# Patient Record
Sex: FEMALE | Race: ASIAN | NOT HISPANIC OR LATINO | URBAN - METROPOLITAN AREA
[De-identification: names, ages, dates, MRNs, and addresses within clinical notes are randomized per-mention and may not be internally consistent; named-entity substitution may affect disease eponyms.]

---

## 2019-03-14 ENCOUNTER — INPATIENT (INPATIENT)
Facility: HOSPITAL | Age: 39
LOS: 5 days | Discharge: HOME | End: 2019-03-20
Attending: OBSTETRICS & GYNECOLOGY | Admitting: OBSTETRICS & GYNECOLOGY
Payer: COMMERCIAL

## 2019-03-14 VITALS — WEIGHT: 167.55 LBS | HEIGHT: 62 IN

## 2019-03-14 DIAGNOSIS — O26.899 OTHER SPECIFIED PREGNANCY RELATED CONDITIONS, UNSPECIFIED TRIMESTER: ICD-10-CM

## 2019-03-14 DIAGNOSIS — Z3A.00 WEEKS OF GESTATION OF PREGNANCY NOT SPECIFIED: ICD-10-CM

## 2019-03-14 LAB
BASOPHILS # BLD AUTO: 0.07 K/UL — SIGNIFICANT CHANGE UP (ref 0–0.2)
BASOPHILS NFR BLD AUTO: 0.9 % — SIGNIFICANT CHANGE UP (ref 0–2)
BLD GP AB SCN SERPL QL: NEGATIVE — SIGNIFICANT CHANGE UP
EOSINOPHIL # BLD AUTO: 0.12 K/UL — SIGNIFICANT CHANGE UP (ref 0–0.5)
EOSINOPHIL NFR BLD AUTO: 1.5 % — SIGNIFICANT CHANGE UP (ref 0–6)
GLUCOSE BLDC GLUCOMTR-MCNC: 106 MG/DL — HIGH (ref 70–99)
HCT VFR BLD CALC: 33.8 % — LOW (ref 34.5–45)
HGB BLD-MCNC: 11.9 G/DL — SIGNIFICANT CHANGE UP (ref 11.5–15.5)
IMM GRANULOCYTES NFR BLD AUTO: 1.6 % — HIGH (ref 0–1.5)
LYMPHOCYTES # BLD AUTO: 1.67 K/UL — SIGNIFICANT CHANGE UP (ref 1–3.3)
LYMPHOCYTES # BLD AUTO: 21.2 % — SIGNIFICANT CHANGE UP (ref 13–44)
MCHC RBC-ENTMCNC: 33.1 PG — SIGNIFICANT CHANGE UP (ref 27–34)
MCHC RBC-ENTMCNC: 35.2 GM/DL — SIGNIFICANT CHANGE UP (ref 32–36)
MCV RBC AUTO: 94.2 FL — SIGNIFICANT CHANGE UP (ref 80–100)
MONOCYTES # BLD AUTO: 0.57 K/UL — SIGNIFICANT CHANGE UP (ref 0–0.9)
MONOCYTES NFR BLD AUTO: 7.2 % — SIGNIFICANT CHANGE UP (ref 2–14)
NEUTROPHILS # BLD AUTO: 5.33 K/UL — SIGNIFICANT CHANGE UP (ref 1.8–7.4)
NEUTROPHILS NFR BLD AUTO: 67.6 % — SIGNIFICANT CHANGE UP (ref 43–77)
NRBC # BLD: 0 /100 WBCS — SIGNIFICANT CHANGE UP (ref 0–0)
PLATELET # BLD AUTO: 237 K/UL — SIGNIFICANT CHANGE UP (ref 150–400)
RBC # BLD: 3.59 M/UL — LOW (ref 3.8–5.2)
RBC # FLD: 12.7 % — SIGNIFICANT CHANGE UP (ref 10.3–14.5)
RH IG SCN BLD-IMP: POSITIVE — SIGNIFICANT CHANGE UP
RH IG SCN BLD-IMP: POSITIVE — SIGNIFICANT CHANGE UP
WBC # BLD: 7.89 K/UL — SIGNIFICANT CHANGE UP (ref 3.8–10.5)
WBC # FLD AUTO: 7.89 K/UL — SIGNIFICANT CHANGE UP (ref 3.8–10.5)

## 2019-03-14 RX ORDER — SODIUM CHLORIDE 9 MG/ML
1000 INJECTION, SOLUTION INTRAVENOUS ONCE
Qty: 0 | Refills: 0 | Status: DISCONTINUED | OUTPATIENT
Start: 2019-03-14 | End: 2019-03-16

## 2019-03-14 RX ORDER — SODIUM CHLORIDE 9 MG/ML
1000 INJECTION, SOLUTION INTRAVENOUS
Qty: 0 | Refills: 0 | Status: DISCONTINUED | OUTPATIENT
Start: 2019-03-14 | End: 2019-03-16

## 2019-03-14 RX ORDER — OXYTOCIN 10 UNIT/ML
333.33 VIAL (ML) INJECTION
Qty: 20 | Refills: 0 | Status: DISCONTINUED | OUTPATIENT
Start: 2019-03-14 | End: 2019-03-16

## 2019-03-14 RX ORDER — CITRIC ACID/SODIUM CITRATE 300-500 MG
15 SOLUTION, ORAL ORAL EVERY 4 HOURS
Qty: 0 | Refills: 0 | Status: DISCONTINUED | OUTPATIENT
Start: 2019-03-14 | End: 2019-03-16

## 2019-03-14 RX ORDER — DINOPROSTONE 10 MG/241MG
10 INSERT VAGINAL ONCE
Qty: 0 | Refills: 0 | Status: DISCONTINUED | OUTPATIENT
Start: 2019-03-14 | End: 2019-03-16

## 2019-03-14 NOTE — PATIENT PROFILE OB - VISION (WITH CORRECTIVE LENSES IF THE PATIENT USUALLY WEARS THEM):
with contacts/Normal vision: sees adequately in most situations; can see medication labels, newsprint

## 2019-03-15 ENCOUNTER — RESULT REVIEW (OUTPATIENT)
Age: 39
End: 2019-03-15

## 2019-03-15 PROBLEM — Z00.00 ENCOUNTER FOR PREVENTIVE HEALTH EXAMINATION: Status: ACTIVE | Noted: 2019-03-15

## 2019-03-15 LAB — T PALLIDUM AB TITR SER: NEGATIVE — SIGNIFICANT CHANGE UP

## 2019-03-15 RX ORDER — OXYTOCIN 10 UNIT/ML
1 VIAL (ML) INJECTION
Qty: 30 | Refills: 0 | Status: DISCONTINUED | OUTPATIENT
Start: 2019-03-15 | End: 2019-03-16

## 2019-03-15 RX ORDER — FENTANYL/BUPIVACAINE/NS/PF 2MCG/ML-.1
250 PLASTIC BAG, INJECTION (ML) INJECTION
Qty: 0 | Refills: 0 | Status: DISCONTINUED | OUTPATIENT
Start: 2019-03-15 | End: 2019-03-16

## 2019-03-15 RX ADMIN — Medication 1 MILLIUNIT(S)/MIN: at 10:11

## 2019-03-15 RX ADMIN — SODIUM CHLORIDE 125 MILLILITER(S): 9 INJECTION, SOLUTION INTRAVENOUS at 00:45

## 2019-03-15 RX ADMIN — SODIUM CHLORIDE 125 MILLILITER(S): 9 INJECTION, SOLUTION INTRAVENOUS at 23:10

## 2019-03-16 LAB
ALBUMIN SERPL ELPH-MCNC: 2.6 G/DL — LOW (ref 3.3–5)
ALBUMIN SERPL ELPH-MCNC: 3 G/DL — LOW (ref 3.3–5)
ALP SERPL-CCNC: 114 U/L — SIGNIFICANT CHANGE UP (ref 40–120)
ALP SERPL-CCNC: 91 U/L — SIGNIFICANT CHANGE UP (ref 40–120)
ALT FLD-CCNC: 10 U/L — SIGNIFICANT CHANGE UP (ref 10–45)
ALT FLD-CCNC: 13 U/L — SIGNIFICANT CHANGE UP (ref 10–45)
ANION GAP SERPL CALC-SCNC: 12 MMOL/L — SIGNIFICANT CHANGE UP (ref 5–17)
ANION GAP SERPL CALC-SCNC: 15 MMOL/L — SIGNIFICANT CHANGE UP (ref 5–17)
AST SERPL-CCNC: 26 U/L — SIGNIFICANT CHANGE UP (ref 10–40)
AST SERPL-CCNC: 30 U/L — SIGNIFICANT CHANGE UP (ref 10–40)
BASOPHILS # BLD AUTO: 0 K/UL — SIGNIFICANT CHANGE UP (ref 0–0.2)
BASOPHILS # BLD AUTO: 0.05 K/UL — SIGNIFICANT CHANGE UP (ref 0–0.2)
BASOPHILS NFR BLD AUTO: 0 % — SIGNIFICANT CHANGE UP (ref 0–2)
BASOPHILS NFR BLD AUTO: 0.4 % — SIGNIFICANT CHANGE UP (ref 0–2)
BILIRUB SERPL-MCNC: 0.3 MG/DL — SIGNIFICANT CHANGE UP (ref 0.2–1.2)
BILIRUB SERPL-MCNC: 0.3 MG/DL — SIGNIFICANT CHANGE UP (ref 0.2–1.2)
BUN SERPL-MCNC: 10 MG/DL — SIGNIFICANT CHANGE UP (ref 7–23)
BUN SERPL-MCNC: 12 MG/DL — SIGNIFICANT CHANGE UP (ref 7–23)
CALCIUM SERPL-MCNC: 8 MG/DL — LOW (ref 8.4–10.5)
CALCIUM SERPL-MCNC: 8.4 MG/DL — SIGNIFICANT CHANGE UP (ref 8.4–10.5)
CHLORIDE SERPL-SCNC: 101 MMOL/L — SIGNIFICANT CHANGE UP (ref 96–108)
CHLORIDE SERPL-SCNC: 102 MMOL/L — SIGNIFICANT CHANGE UP (ref 96–108)
CO2 SERPL-SCNC: 18 MMOL/L — LOW (ref 22–31)
CO2 SERPL-SCNC: 21 MMOL/L — LOW (ref 22–31)
CREAT ?TM UR-MCNC: 57 MG/DL — SIGNIFICANT CHANGE UP
CREAT SERPL-MCNC: 0.93 MG/DL — SIGNIFICANT CHANGE UP (ref 0.5–1.3)
CREAT SERPL-MCNC: 1.42 MG/DL — HIGH (ref 0.5–1.3)
EOSINOPHIL # BLD AUTO: 0 K/UL — SIGNIFICANT CHANGE UP (ref 0–0.5)
EOSINOPHIL # BLD AUTO: 0.02 K/UL — SIGNIFICANT CHANGE UP (ref 0–0.5)
EOSINOPHIL NFR BLD AUTO: 0 % — SIGNIFICANT CHANGE UP (ref 0–6)
EOSINOPHIL NFR BLD AUTO: 0.1 % — SIGNIFICANT CHANGE UP (ref 0–6)
GLUCOSE SERPL-MCNC: 108 MG/DL — HIGH (ref 70–99)
GLUCOSE SERPL-MCNC: 187 MG/DL — HIGH (ref 70–99)
HCT VFR BLD CALC: 26 % — LOW (ref 34.5–45)
HCT VFR BLD CALC: 31.3 % — LOW (ref 34.5–45)
HGB BLD-MCNC: 10.8 G/DL — LOW (ref 11.5–15.5)
HGB BLD-MCNC: 8.7 G/DL — LOW (ref 11.5–15.5)
IMM GRANULOCYTES NFR BLD AUTO: 1 % — SIGNIFICANT CHANGE UP (ref 0–1.5)
LDH SERPL L TO P-CCNC: 256 U/L — HIGH (ref 50–242)
LYMPHOCYTES # BLD AUTO: 0.46 K/UL — LOW (ref 1–3.3)
LYMPHOCYTES # BLD AUTO: 0.93 K/UL — LOW (ref 1–3.3)
LYMPHOCYTES # BLD AUTO: 2.6 % — LOW (ref 13–44)
LYMPHOCYTES # BLD AUTO: 6.7 % — LOW (ref 13–44)
MAGNESIUM SERPL-MCNC: 5.4 MG/DL — HIGH (ref 1.6–2.6)
MAGNESIUM SERPL-MCNC: 6.2 MG/DL — HIGH (ref 1.6–2.6)
MAGNESIUM SERPL-MCNC: >17.6 MG/DL — CRITICAL HIGH (ref 1.6–2.6)
MCHC RBC-ENTMCNC: 31.4 PG — SIGNIFICANT CHANGE UP (ref 27–34)
MCHC RBC-ENTMCNC: 32.2 PG — SIGNIFICANT CHANGE UP (ref 27–34)
MCHC RBC-ENTMCNC: 33.5 GM/DL — SIGNIFICANT CHANGE UP (ref 32–36)
MCHC RBC-ENTMCNC: 34.5 GM/DL — SIGNIFICANT CHANGE UP (ref 32–36)
MCV RBC AUTO: 93.4 FL — SIGNIFICANT CHANGE UP (ref 80–100)
MCV RBC AUTO: 93.9 FL — SIGNIFICANT CHANGE UP (ref 80–100)
MONOCYTES # BLD AUTO: 0.46 K/UL — SIGNIFICANT CHANGE UP (ref 0–0.9)
MONOCYTES # BLD AUTO: 1.02 K/UL — HIGH (ref 0–0.9)
MONOCYTES NFR BLD AUTO: 2.6 % — SIGNIFICANT CHANGE UP (ref 2–14)
MONOCYTES NFR BLD AUTO: 7.3 % — SIGNIFICANT CHANGE UP (ref 2–14)
NEUTROPHILS # BLD AUTO: 11.79 K/UL — HIGH (ref 1.8–7.4)
NEUTROPHILS # BLD AUTO: 16.54 K/UL — HIGH (ref 1.8–7.4)
NEUTROPHILS NFR BLD AUTO: 84.5 % — HIGH (ref 43–77)
NEUTROPHILS NFR BLD AUTO: 87.1 % — HIGH (ref 43–77)
NRBC # BLD: 0 /100 WBCS — SIGNIFICANT CHANGE UP (ref 0–0)
PLATELET # BLD AUTO: 174 K/UL — SIGNIFICANT CHANGE UP (ref 150–400)
PLATELET # BLD AUTO: 185 K/UL — SIGNIFICANT CHANGE UP (ref 150–400)
POTASSIUM SERPL-MCNC: 3.8 MMOL/L — SIGNIFICANT CHANGE UP (ref 3.5–5.3)
POTASSIUM SERPL-MCNC: 4.2 MMOL/L — SIGNIFICANT CHANGE UP (ref 3.5–5.3)
POTASSIUM SERPL-SCNC: 3.8 MMOL/L — SIGNIFICANT CHANGE UP (ref 3.5–5.3)
POTASSIUM SERPL-SCNC: 4.2 MMOL/L — SIGNIFICANT CHANGE UP (ref 3.5–5.3)
PROT ?TM UR-MCNC: 20 MG/DL — HIGH (ref 0–12)
PROT SERPL-MCNC: 5 G/DL — LOW (ref 6–8.3)
PROT SERPL-MCNC: 5.9 G/DL — LOW (ref 6–8.3)
PROT/CREAT UR-RTO: 0.4 RATIO — HIGH (ref 0–0.2)
RBC # BLD: 2.77 M/UL — LOW (ref 3.8–5.2)
RBC # BLD: 3.35 M/UL — LOW (ref 3.8–5.2)
RBC # FLD: 12.9 % — SIGNIFICANT CHANGE UP (ref 10.3–14.5)
RBC # FLD: 12.9 % — SIGNIFICANT CHANGE UP (ref 10.3–14.5)
SODIUM SERPL-SCNC: 134 MMOL/L — LOW (ref 135–145)
SODIUM SERPL-SCNC: 135 MMOL/L — SIGNIFICANT CHANGE UP (ref 135–145)
URATE SERPL-MCNC: 7.4 MG/DL — HIGH (ref 2.5–7)
WBC # BLD: 13.95 K/UL — HIGH (ref 3.8–10.5)
WBC # BLD: 17.6 K/UL — HIGH (ref 3.8–10.5)
WBC # FLD AUTO: 13.95 K/UL — HIGH (ref 3.8–10.5)
WBC # FLD AUTO: 17.6 K/UL — HIGH (ref 3.8–10.5)

## 2019-03-16 RX ORDER — OXYCODONE HYDROCHLORIDE 5 MG/1
5 TABLET ORAL
Qty: 0 | Refills: 0 | Status: DISCONTINUED | OUTPATIENT
Start: 2019-03-16 | End: 2019-03-20

## 2019-03-16 RX ORDER — TETANUS TOXOID, REDUCED DIPHTHERIA TOXOID AND ACELLULAR PERTUSSIS VACCINE, ADSORBED 5; 2.5; 8; 8; 2.5 [IU]/.5ML; [IU]/.5ML; UG/.5ML; UG/.5ML; UG/.5ML
0.5 SUSPENSION INTRAMUSCULAR ONCE
Qty: 0 | Refills: 0 | Status: DISCONTINUED | OUTPATIENT
Start: 2019-03-16 | End: 2019-03-20

## 2019-03-16 RX ORDER — OXYTOCIN 10 UNIT/ML
41.67 VIAL (ML) INJECTION
Qty: 20 | Refills: 0 | Status: DISCONTINUED | OUTPATIENT
Start: 2019-03-16 | End: 2019-03-20

## 2019-03-16 RX ORDER — SIMETHICONE 80 MG/1
80 TABLET, CHEWABLE ORAL EVERY 4 HOURS
Qty: 0 | Refills: 0 | Status: DISCONTINUED | OUTPATIENT
Start: 2019-03-16 | End: 2019-03-20

## 2019-03-16 RX ORDER — OXYTOCIN 10 UNIT/ML
333.33 VIAL (ML) INJECTION
Qty: 20 | Refills: 0 | Status: DISCONTINUED | OUTPATIENT
Start: 2019-03-16 | End: 2019-03-20

## 2019-03-16 RX ORDER — ACETAMINOPHEN 500 MG
650 TABLET ORAL EVERY 6 HOURS
Qty: 0 | Refills: 0 | Status: DISCONTINUED | OUTPATIENT
Start: 2019-03-16 | End: 2019-03-20

## 2019-03-16 RX ORDER — NALOXONE HYDROCHLORIDE 4 MG/.1ML
0.1 SPRAY NASAL
Qty: 0 | Refills: 0 | Status: DISCONTINUED | OUTPATIENT
Start: 2019-03-16 | End: 2019-03-20

## 2019-03-16 RX ORDER — SODIUM CHLORIDE 9 MG/ML
1000 INJECTION, SOLUTION INTRAVENOUS
Qty: 0 | Refills: 0 | Status: DISCONTINUED | OUTPATIENT
Start: 2019-03-16 | End: 2019-03-16

## 2019-03-16 RX ORDER — ONDANSETRON 8 MG/1
4 TABLET, FILM COATED ORAL EVERY 6 HOURS
Qty: 0 | Refills: 0 | Status: DISCONTINUED | OUTPATIENT
Start: 2019-03-16 | End: 2019-03-20

## 2019-03-16 RX ORDER — OXYCODONE AND ACETAMINOPHEN 5; 325 MG/1; MG/1
2 TABLET ORAL EVERY 6 HOURS
Qty: 0 | Refills: 0 | Status: DISCONTINUED | OUTPATIENT
Start: 2019-03-16 | End: 2019-03-16

## 2019-03-16 RX ORDER — FERROUS SULFATE 325(65) MG
325 TABLET ORAL DAILY
Qty: 0 | Refills: 0 | Status: DISCONTINUED | OUTPATIENT
Start: 2019-03-16 | End: 2019-03-18

## 2019-03-16 RX ORDER — CITRIC ACID/SODIUM CITRATE 300-500 MG
30 SOLUTION, ORAL ORAL ONCE
Qty: 0 | Refills: 0 | Status: COMPLETED | OUTPATIENT
Start: 2019-03-16 | End: 2019-03-16

## 2019-03-16 RX ORDER — DIPHENHYDRAMINE HCL 50 MG
25 CAPSULE ORAL EVERY 6 HOURS
Qty: 0 | Refills: 0 | Status: DISCONTINUED | OUTPATIENT
Start: 2019-03-16 | End: 2019-03-20

## 2019-03-16 RX ORDER — AZITHROMYCIN 500 MG/1
500 TABLET, FILM COATED ORAL ONCE
Qty: 0 | Refills: 0 | Status: COMPLETED | OUTPATIENT
Start: 2019-03-16 | End: 2019-03-16

## 2019-03-16 RX ORDER — SODIUM CHLORIDE 9 MG/ML
1000 INJECTION, SOLUTION INTRAVENOUS
Qty: 0 | Refills: 0 | Status: DISCONTINUED | OUTPATIENT
Start: 2019-03-16 | End: 2019-03-20

## 2019-03-16 RX ORDER — GLYCERIN ADULT
1 SUPPOSITORY, RECTAL RECTAL AT BEDTIME
Qty: 0 | Refills: 0 | Status: DISCONTINUED | OUTPATIENT
Start: 2019-03-16 | End: 2019-03-20

## 2019-03-16 RX ORDER — CEFAZOLIN SODIUM 1 G
2000 VIAL (EA) INJECTION ONCE
Qty: 0 | Refills: 0 | Status: COMPLETED | OUTPATIENT
Start: 2019-03-16 | End: 2019-03-16

## 2019-03-16 RX ORDER — DOCUSATE SODIUM 100 MG
100 CAPSULE ORAL
Qty: 0 | Refills: 0 | Status: DISCONTINUED | OUTPATIENT
Start: 2019-03-16 | End: 2019-03-20

## 2019-03-16 RX ORDER — OXYCODONE HYDROCHLORIDE 5 MG/1
10 TABLET ORAL
Qty: 0 | Refills: 0 | Status: DISCONTINUED | OUTPATIENT
Start: 2019-03-16 | End: 2019-03-20

## 2019-03-16 RX ORDER — LANOLIN
1 OINTMENT (GRAM) TOPICAL
Qty: 0 | Refills: 0 | Status: DISCONTINUED | OUTPATIENT
Start: 2019-03-16 | End: 2019-03-20

## 2019-03-16 RX ORDER — MAGNESIUM SULFATE 500 MG/ML
2 VIAL (ML) INJECTION
Qty: 40 | Refills: 0 | Status: DISCONTINUED | OUTPATIENT
Start: 2019-03-16 | End: 2019-03-17

## 2019-03-16 RX ORDER — MAGNESIUM SULFATE 500 MG/ML
4 VIAL (ML) INJECTION ONCE
Qty: 0 | Refills: 0 | Status: COMPLETED | OUTPATIENT
Start: 2019-03-16 | End: 2019-03-16

## 2019-03-16 RX ORDER — ONDANSETRON 8 MG/1
4 TABLET, FILM COATED ORAL EVERY 6 HOURS
Qty: 0 | Refills: 0 | Status: COMPLETED | OUTPATIENT
Start: 2019-03-16 | End: 2019-03-16

## 2019-03-16 RX ORDER — OXYCODONE AND ACETAMINOPHEN 5; 325 MG/1; MG/1
1 TABLET ORAL
Qty: 0 | Refills: 0 | Status: DISCONTINUED | OUTPATIENT
Start: 2019-03-16 | End: 2019-03-16

## 2019-03-16 RX ORDER — IBUPROFEN 200 MG
600 TABLET ORAL EVERY 6 HOURS
Qty: 0 | Refills: 0 | Status: DISCONTINUED | OUTPATIENT
Start: 2019-03-16 | End: 2019-03-20

## 2019-03-16 RX ADMIN — OXYCODONE HYDROCHLORIDE 5 MILLIGRAM(S): 5 TABLET ORAL at 19:11

## 2019-03-16 RX ADMIN — ONDANSETRON 4 MILLIGRAM(S): 8 TABLET, FILM COATED ORAL at 15:46

## 2019-03-16 RX ADMIN — Medication 600 MILLIGRAM(S): at 14:29

## 2019-03-16 RX ADMIN — Medication 100 MILLIGRAM(S): at 01:36

## 2019-03-16 RX ADMIN — Medication 600 MILLIGRAM(S): at 23:15

## 2019-03-16 RX ADMIN — Medication 30 MILLILITER(S): at 01:37

## 2019-03-16 RX ADMIN — OXYCODONE HYDROCHLORIDE 5 MILLIGRAM(S): 5 TABLET ORAL at 22:33

## 2019-03-16 RX ADMIN — OXYCODONE HYDROCHLORIDE 5 MILLIGRAM(S): 5 TABLET ORAL at 20:00

## 2019-03-16 RX ADMIN — OXYCODONE HYDROCHLORIDE 5 MILLIGRAM(S): 5 TABLET ORAL at 23:15

## 2019-03-16 RX ADMIN — Medication 600 MILLIGRAM(S): at 15:17

## 2019-03-16 RX ADMIN — Medication 600 MILLIGRAM(S): at 22:34

## 2019-03-16 RX ADMIN — AZITHROMYCIN 255 MILLIGRAM(S): 500 TABLET, FILM COATED ORAL at 01:36

## 2019-03-16 RX ADMIN — Medication 300 GRAM(S): at 04:25

## 2019-03-16 RX ADMIN — Medication 50 GM/HR: at 04:50

## 2019-03-16 RX ADMIN — ONDANSETRON 4 MILLIGRAM(S): 8 TABLET, FILM COATED ORAL at 10:01

## 2019-03-16 NOTE — PROGRESS NOTE ADULT - ASSESSMENT
A&P: 38y Female   s/p  c/s POD#0, complicated by preeclampsia with severe features.      1. Preeclampsia:   Continue IV Magnesium @2G/hr for 24 hrs post delivery.   Magnesium clinical checks and serum assay q6 hrs.  Next Mg check @ 1630   No complaints currently.   BP controlled without antihypertensives.   2. GI: Diet: Clears as tolerated  3. Neuro: PO pain medications PRN, hold NSAIDs  4. : strict Is and Os, smith in place

## 2019-03-16 NOTE — PROGRESS NOTE ADULT - ASSESSMENT
POD#0 after C/S for arrest of descent, c/b PPH w/ EBL 1500, s/p hemabate and cytotec, now w/ preeclampsia w/ severe features (elevated creatinine)  Will start IV magnesium, continue for 24 hrs, mg checks q6 hrs.

## 2019-03-16 NOTE — PROGRESS NOTE ADULT - SUBJECTIVE AND OBJECTIVE BOX
Patient evaluated at bedside for clinical magnesium check.     She denies visual disturbances including scotoma, headache and right upper quadrant pain. Also denies nausea/vomiting/epigastric pain/shortness of breath. Pain well controlled.      T(C): 36.3 (03-16-19 @ 16:00), Max: 36.6 (03-16-19 @ 10:00)  HR: 90 (03-16-19 @ 16:00) (68 - 90)  BP: 126/81 (03-16-19 @ 16:00) (105/60 - 126/81)  RR: 18 (03-16-19 @ 16:00) (15 - 18)  SpO2: 96% (03-16-19 @ 16:00) (95% - 98%)    Gen: NAD  Pulm: CTAB  Abd: soft, nontender, no rebound or guarding, no epigastric tenderness, liver nonpalpable +BS, fundus palpated   : Beckford in place  Ext: Reflexes +2                          8.7    17.60 )-----------( 174      ( 16 Mar 2019 10:58 )             26.0     03-16    135  |  102  |  10  ----------------------------<  187<H>  4.2   |  21<L>  |  0.93    Ca    8.0<L>      16 Mar 2019 10:58  Mg     5.4     03-16    TPro  5.0<L>  /  Alb  2.6<L>  /  TBili  0.3  /  DBili  x   /  AST  30  /  ALT  10  /  AlkPhos  91  03-16        03-15-19 @ 07:01  -  03-16-19 @ 07:00  --------------------------------------------------------  IN: 4400 mL / OUT: 3125 mL / NET: 1275 mL    03-16-19 @ 07:01  -  03-16-19 @ 17:33  --------------------------------------------------------  IN: 875 mL / OUT: 1225 mL / NET: -350 mL

## 2019-03-16 NOTE — PROGRESS NOTE ADULT - ASSESSMENT
A&P: 38y Female   s/p  c/s POD0 complicated by preeclampsia with severe features.      1. Preeclampsia:   Continue IV Magnesium @2G/hr for 24 hrs  until 0430  Magnesium clinical checks and serum assay q6 hrs.  Next Mg check @ 2230   No complaints currently.   BP controlled  2. GI: Diet: Clears as tolerated  3. Neuro: PO pain medications PRN, hold NSAIDs  4. : strict Is and Os, smith in place

## 2019-03-16 NOTE — PROGRESS NOTE ADULT - SUBJECTIVE AND OBJECTIVE BOX
Patient had PEC labs sent from OR during , creatinine 1.42. Patient asymptomatic w/ mild range HTN, but given elevated creatinine meets criteria for PEC w/ severe features. Will start IV magnesium for 24 hrs.     Vital Signs Last 24 Hrs  T(C): 36.1 (16 Mar 2019 03:20), Max: 36.1 (16 Mar 2019 03:20)  T(F): 97 (16 Mar 2019 03:20), Max: 97 (16 Mar 2019 03:20)  HR: 98 (16 Mar 2019 03:30) (98 - 100)  BP: 127/73 (16 Mar 2019 03:30) (127/73 - 130/74)  BP(mean): --  RR: 18 (16 Mar 2019 03:30) (18 - 18)  SpO2: 100% (16 Mar 2019 03:30) (100% - 100%)    gen: nad, aox3  chest: normal work of breathing  abdomen: soft, mildly distended, fundus firm  extremities: 2+pitting edema bilaterally, reflexes 2+ bilaterally                          10.8   13.95 )-----------( 185      ( 16 Mar 2019 03:14 )             31.3   -16    134<L>  |  101  |  12  ----------------------------<  108<H>  3.8   |  18<L>  |  1.42<H>    Ca    8.4      16 Mar 2019 03:14    TPro  5.9<L>  /  Alb  3.0<L>  /  TBili  0.3  /  DBili  x   /  AST  26  /  ALT  13  /  AlkPhos  114  16

## 2019-03-16 NOTE — PROGRESS NOTE ADULT - SUBJECTIVE AND OBJECTIVE BOX
Patient evaluated at bedside for clinical magnesium check.     She denies visual disturbances including scotoma, headache and right upper quadrant pain. Also denies nausea/vomiting/epigastric pain/shortness of breath. Pain well controlled.      T(C): 36.6 (03-16-19 @ 10:00), Max: 36.6 (03-16-19 @ 10:00)  HR: 71 (03-16-19 @ 10:00) (71 - 100)  BP: 117/64 (03-16-19 @ 10:00) (105/60 - 130/74)  RR: 16 (03-16-19 @ 10:00) (15 - 18)  SpO2: 97% (03-16-19 @ 10:00) (95% - 100%)  Wt(kg): --    Gen: NAD  Pulm: CTAB  Abd: soft, nontender, no rebound or guarding, no epigastric tenderness, liver nonpalpable +BS, fundus palpated   : Beckford in place  Ext: Reflexes +                          8.7    17.60 )-----------( 174      ( 16 Mar 2019 10:58 )             26.0     03-16    135  |  102  |  10  ----------------------------<  187<H>  4.2   |  21<L>  |  0.93    Ca    8.0<L>      16 Mar 2019 10:58  Mg     5.4     03-16    TPro  5.0<L>  /  Alb  2.6<L>  /  TBili  0.3  /  DBili  x   /  AST  30  /  ALT  10  /  AlkPhos  91  03-16        03-15-19 @ 07:01  -  03-16-19 @ 07:00  --------------------------------------------------------  IN: 4400 mL / OUT: 3125 mL / NET: 1275 mL    03-16-19 @ 07:01  -  03-16-19 @ 12:21  --------------------------------------------------------  IN: 375 mL / OUT: 400 mL / NET: -25 mL

## 2019-03-17 LAB — MAGNESIUM SERPL-MCNC: 6.4 MG/DL — HIGH (ref 1.6–2.6)

## 2019-03-17 RX ORDER — MAGNESIUM SULFATE 500 MG/ML
1 VIAL (ML) INJECTION
Qty: 40 | Refills: 0 | Status: DISCONTINUED | OUTPATIENT
Start: 2019-03-17 | End: 2019-03-17

## 2019-03-17 RX ADMIN — Medication 600 MILLIGRAM(S): at 17:38

## 2019-03-17 RX ADMIN — OXYCODONE HYDROCHLORIDE 10 MILLIGRAM(S): 5 TABLET ORAL at 08:23

## 2019-03-17 RX ADMIN — Medication 600 MILLIGRAM(S): at 16:57

## 2019-03-17 RX ADMIN — Medication 600 MILLIGRAM(S): at 22:50

## 2019-03-17 RX ADMIN — Medication 600 MILLIGRAM(S): at 23:20

## 2019-03-17 RX ADMIN — OXYCODONE HYDROCHLORIDE 10 MILLIGRAM(S): 5 TABLET ORAL at 05:30

## 2019-03-17 RX ADMIN — OXYCODONE HYDROCHLORIDE 5 MILLIGRAM(S): 5 TABLET ORAL at 01:47

## 2019-03-17 RX ADMIN — OXYCODONE HYDROCHLORIDE 5 MILLIGRAM(S): 5 TABLET ORAL at 02:30

## 2019-03-17 RX ADMIN — Medication 650 MILLIGRAM(S): at 19:30

## 2019-03-17 RX ADMIN — Medication 1 TABLET(S): at 14:27

## 2019-03-17 RX ADMIN — SIMETHICONE 80 MILLIGRAM(S): 80 TABLET, CHEWABLE ORAL at 22:49

## 2019-03-17 RX ADMIN — Medication 100 MILLIGRAM(S): at 22:50

## 2019-03-17 RX ADMIN — OXYCODONE HYDROCHLORIDE 10 MILLIGRAM(S): 5 TABLET ORAL at 09:00

## 2019-03-17 RX ADMIN — OXYCODONE HYDROCHLORIDE 5 MILLIGRAM(S): 5 TABLET ORAL at 14:28

## 2019-03-17 RX ADMIN — Medication 600 MILLIGRAM(S): at 05:30

## 2019-03-17 RX ADMIN — OXYCODONE HYDROCHLORIDE 10 MILLIGRAM(S): 5 TABLET ORAL at 04:51

## 2019-03-17 RX ADMIN — SIMETHICONE 80 MILLIGRAM(S): 80 TABLET, CHEWABLE ORAL at 14:27

## 2019-03-17 RX ADMIN — Medication 650 MILLIGRAM(S): at 18:34

## 2019-03-17 RX ADMIN — OXYCODONE HYDROCHLORIDE 5 MILLIGRAM(S): 5 TABLET ORAL at 15:20

## 2019-03-17 RX ADMIN — Medication 600 MILLIGRAM(S): at 11:29

## 2019-03-17 RX ADMIN — Medication 600 MILLIGRAM(S): at 10:41

## 2019-03-17 RX ADMIN — Medication 100 MILLIGRAM(S): at 14:27

## 2019-03-17 RX ADMIN — Medication 325 MILLIGRAM(S): at 14:27

## 2019-03-17 RX ADMIN — Medication 600 MILLIGRAM(S): at 04:50

## 2019-03-17 NOTE — PROGRESS NOTE ADULT - SUBJECTIVE AND OBJECTIVE BOX
Patient evaluated at bedside.   She reports significant pain this morning. Patient would like her medications scheduled.   She denies headache, dizziness, chest pain, palpitations, shortness of breathe, nausea, vomiting or heavy vaginal bleeding.  She has been ambulating without assistance, voiding spontaneously, passing gas, tolerating regular diet and is breastfeeding. Patient would like to rest now.     Physical Exam:  Vital Signs Last 24 Hrs  T(C): 36.4 (17 Mar 2019 06:18), Max: 36.7 (16 Mar 2019 20:01)  T(F): 97.6 (17 Mar 2019 06:18), Max: 98 (16 Mar 2019 20:01)  HR: 80 (17 Mar 2019 06:18) (68 - 90)  BP: 100/57 (17 Mar 2019 06:18) (100/57 - 126/81)  BP(mean): --  RR: 18 (17 Mar 2019 06:18) (15 - 19)  SpO2: 80% (17 Mar 2019 06:18) (80% - 100%)    03-16 @ 07:01  -  03-17 @ 07:00  --------------------------------------------------------  IN: 875 mL / OUT: 3650 mL / NET: -2775 mL        GA: NAD, resting comfortably   Abd: + BS, soft, mildly tender, moderately distended, no rebound or guarding, uterus firm at midline,  fb below umbilicus  Incision: prevena in place   : lochia WNL  Extremities: no swelling or calf tenderness                            8.7    17.60 )-----------( 174      ( 16 Mar 2019 10:58 )             26.0     03-16    135  |  102  |  10  ----------------------------<  187<H>  4.2   |  21<L>  |  0.93    Ca    8.0<L>      16 Mar 2019 10:58  Mg     6.4     03-17    TPro  5.0<L>  /  Alb  2.6<L>  /  TBili  0.3  /  DBili  x   /  AST  30  /  ALT  10  /  AlkPhos  91  03-16    Magnesium, Serum: 6.4 mg/dL (03-17 @ 00:45)  Magnesium, Serum: >17.6 mg/dL (03-16 @ 23:12)

## 2019-03-17 NOTE — LACTATION INITIAL EVALUATION - NS LACT CON REASON FOR REQ
primaparous mom/40hr old  4.8% wt loss 5voids/3stools since birth (breast only). s/p failed induction, mag for preeclampsia, and PPH. parents report they worked with their  after the delivery and plan to have a postpartum visit with her as well. baby reported to be latching well. mom denies nipple pain or pinching. baby asleep 1 hr post feed. mom states she will alert LC at next feed if she desires assistance. reviewed bfing basics, positioning techniques, feeding/satiety cues, signs of good latch, and encouraged s2s contact. advised responsive feeding 8-12x/day.

## 2019-03-17 NOTE — PROGRESS NOTE ADULT - SUBJECTIVE AND OBJECTIVE BOX
Serum magnesium level noted to be 17.6, d/w nursing that mag was drawn off of IV arm, will repeat mag level now  Patient remains asymptomatic however last mag level was 6.2, will plan to cut magnesium to 1g/hr

## 2019-03-17 NOTE — PROGRESS NOTE ADULT - ASSESSMENT
A&P: 38y Female   s/p  primary c/s for arrest of descent  complicated by preeclampsia with severe features.      1. Preeclampsia:   Continue IV Magnesium @2G/hr for 24 hrs post delivery.   Magnesium clinical checks and serum assay q6 hrs.    No complaints currently.   BP controlled  2. GI: Diet: Clears as tolerated  3. Neuro: PO pain medications PRN, hold NSAIDs  4. : strict Is and Os, smith in place A&P: 38y Female   s/p  primary c/s for arrest of descent  complicated by preeclampsia with severe features.      1. Preeclampsia:   Continue IV Magnesium @1G/hr for 24 hrs post delivery. Falsely elevated amg serum level as was drawn off IV arm, will continue to monitor for toxicity   Magnesium clinical checks and serum assay q6 hrs.    No complaints currently.   BP controlled  2. GI: Diet: Clears as tolerated  3. Neuro: PO pain medications PRN, hold NSAIDs  4. : strict Is and Os, smith in place

## 2019-03-17 NOTE — PROGRESS NOTE ADULT - SUBJECTIVE AND OBJECTIVE BOX
Patient evaluated at bedside for clinical magnesium check.     She denies visual disturbances including scotoma, headache and right upper quadrant pain. Also denies nausea/vomiting/epigastric pain/shortness of breath. Pain well controlled.      T(C): 36.7 (03-16-19 @ 22:13), Max: 36.7 (03-16-19 @ 20:01)  HR: 81 (03-16-19 @ 22:13) (68 - 90)  BP: 116/64 (03-16-19 @ 22:13) (112/64 - 126/81)  RR: 18 (03-16-19 @ 22:13) (15 - 18)  SpO2: 100% (03-16-19 @ 22:13) (95% - 100%)  Wt(kg): --    Gen: NAD  Pulm: CTAB  Abd: soft, nontender, no rebound or guarding, no epigastric tenderness, liver nonpalpable +BS, fundus palpated   : Beckford in place  Ext: Reflexes +                          8.7    17.60 )-----------( 174      ( 16 Mar 2019 10:58 )             26.0     03-16    135  |  102  |  10  ----------------------------<  187<H>  4.2   |  21<L>  |  0.93    Ca    8.0<L>      16 Mar 2019 10:58  Mg     >17.6     03-16    TPro  5.0<L>  /  Alb  2.6<L>  /  TBili  0.3  /  DBili  x   /  AST  30  /  ALT  10  /  AlkPhos  91  03-16        03-15-19 @ 07:01  -  03-16-19 @ 07:00  --------------------------------------------------------  IN: 4400 mL / OUT: 3125 mL / NET: 1275 mL    03-16-19 @ 07:01  -  03-17-19 @ 00:22  --------------------------------------------------------  IN: 875 mL / OUT: 2300 mL / NET: -1425 mL      MEDICATIONS  (STANDING):  diphtheria/tetanus/pertussis (acellular) Vaccine (ADAcel) 0.5 milliLiter(s) IntraMuscular once  ferrous    sulfate 325 milliGRAM(s) Oral daily  ibuprofen  Tablet. 600 milliGRAM(s) Oral every 6 hours  lactated ringers. 1000 milliLiter(s) (125 mL/Hr) IV Continuous <Continuous>  lactated ringers. 1000 milliLiter(s) (75 mL/Hr) IV Continuous <Continuous>  magnesium sulfate Infusion 2 Gm/Hr (50 mL/Hr) IV Continuous <Continuous>  oxytocin Infusion 333.333 milliUNIT(s)/Min (1000 mL/Hr) IV Continuous <Continuous>  oxytocin Infusion 41.667 milliUNIT(s)/Min (125 mL/Hr) IV Continuous <Continuous>  oxytocin Infusion 41.667 milliUNIT(s)/Min (125 mL/Hr) IV Continuous <Continuous>  prenatal multivitamin 1 Tablet(s) Oral daily    MEDICATIONS  (PRN):  acetaminophen   Tablet .. 650 milliGRAM(s) Oral every 6 hours PRN Temp greater or equal to 38.5C (101.3F), Mild Pain (1 - 3)  diphenhydrAMINE 25 milliGRAM(s) Oral every 6 hours PRN Itching  docusate sodium 100 milliGRAM(s) Oral two times a day PRN Stool Softening  glycerin Suppository - Adult 1 Suppository(s) Rectal at bedtime PRN Constipation  lanolin Ointment 1 Application(s) Topical every 3 hours PRN Sore Nipples  naloxone Injectable 0.1 milliGRAM(s) IV Push every 3 minutes PRN For ANY of the following changes in patient status:  A. RR LESS THAN 10 breaths per minute, B. Oxygen saturation LESS THAN 90%, C. Sedation score of 6  ondansetron Injectable 4 milliGRAM(s) IV Push every 6 hours PRN Nausea  oxyCODONE    IR 5 milliGRAM(s) Oral every 3 hours PRN Moderate Pain (4 - 6)  oxyCODONE    IR 10 milliGRAM(s) Oral every 3 hours PRN Severe Pain (7 - 10)  simethicone 80 milliGRAM(s) Chew every 4 hours PRN Gas

## 2019-03-17 NOTE — PROGRESS NOTE ADULT - ASSESSMENT
A/P  37yo s/p c/s, POD #1, stable and meeting postoperative milestones appropriately.   1. Pain: Will schedule pain medication  2. GI: Regular diet as tolerated   3. : voiding   4. DVT prophylaxis: ambulation, Subcutaneous heparin   5. Preeclampsia: Status post IV Magnesium for 24 hrs post delivery.   6. Continue routine post op care

## 2019-03-18 LAB
ALBUMIN SERPL ELPH-MCNC: 2.2 G/DL — LOW (ref 3.3–5)
ALP SERPL-CCNC: 98 U/L — SIGNIFICANT CHANGE UP (ref 40–120)
ALT FLD-CCNC: 7 U/L — LOW (ref 10–45)
ANION GAP SERPL CALC-SCNC: 7 MMOL/L — SIGNIFICANT CHANGE UP (ref 5–17)
AST SERPL-CCNC: 18 U/L — SIGNIFICANT CHANGE UP (ref 10–40)
BILIRUB SERPL-MCNC: 0.2 MG/DL — SIGNIFICANT CHANGE UP (ref 0.2–1.2)
BUN SERPL-MCNC: 9 MG/DL — SIGNIFICANT CHANGE UP (ref 7–23)
CALCIUM SERPL-MCNC: 7.8 MG/DL — LOW (ref 8.4–10.5)
CHLORIDE SERPL-SCNC: 103 MMOL/L — SIGNIFICANT CHANGE UP (ref 96–108)
CO2 SERPL-SCNC: 28 MMOL/L — SIGNIFICANT CHANGE UP (ref 22–31)
CREAT SERPL-MCNC: 0.89 MG/DL — SIGNIFICANT CHANGE UP (ref 0.5–1.3)
GLUCOSE SERPL-MCNC: 91 MG/DL — SIGNIFICANT CHANGE UP (ref 70–99)
HCT VFR BLD CALC: 19.4 % — CRITICAL LOW (ref 34.5–45)
HGB BLD-MCNC: 6.3 G/DL — CRITICAL LOW (ref 11.5–15.5)
MCHC RBC-ENTMCNC: 32 PG — SIGNIFICANT CHANGE UP (ref 27–34)
MCHC RBC-ENTMCNC: 32.5 GM/DL — SIGNIFICANT CHANGE UP (ref 32–36)
MCV RBC AUTO: 98.5 FL — SIGNIFICANT CHANGE UP (ref 80–100)
NRBC # BLD: 0 /100 WBCS — SIGNIFICANT CHANGE UP (ref 0–0)
PLATELET # BLD AUTO: 181 K/UL — SIGNIFICANT CHANGE UP (ref 150–400)
POTASSIUM SERPL-MCNC: 4.3 MMOL/L — SIGNIFICANT CHANGE UP (ref 3.5–5.3)
POTASSIUM SERPL-SCNC: 4.3 MMOL/L — SIGNIFICANT CHANGE UP (ref 3.5–5.3)
PROT SERPL-MCNC: 4.9 G/DL — LOW (ref 6–8.3)
RBC # BLD: 1.97 M/UL — LOW (ref 3.8–5.2)
RBC # FLD: 13.6 % — SIGNIFICANT CHANGE UP (ref 10.3–14.5)
SODIUM SERPL-SCNC: 138 MMOL/L — SIGNIFICANT CHANGE UP (ref 135–145)
WBC # BLD: 12.51 K/UL — HIGH (ref 3.8–10.5)
WBC # FLD AUTO: 12.51 K/UL — HIGH (ref 3.8–10.5)

## 2019-03-18 RX ORDER — FERROUS SULFATE 325(65) MG
325 TABLET ORAL DAILY
Qty: 0 | Refills: 0 | Status: DISCONTINUED | OUTPATIENT
Start: 2019-03-18 | End: 2019-03-18

## 2019-03-18 RX ORDER — FERROUS SULFATE 325(65) MG
325 TABLET ORAL THREE TIMES A DAY
Qty: 0 | Refills: 0 | Status: DISCONTINUED | OUTPATIENT
Start: 2019-03-18 | End: 2019-03-20

## 2019-03-18 RX ADMIN — Medication 325 MILLIGRAM(S): at 23:06

## 2019-03-18 RX ADMIN — Medication 600 MILLIGRAM(S): at 11:30

## 2019-03-18 RX ADMIN — Medication 600 MILLIGRAM(S): at 14:15

## 2019-03-18 RX ADMIN — Medication 100 MILLIGRAM(S): at 15:34

## 2019-03-18 RX ADMIN — Medication 325 MILLIGRAM(S): at 15:33

## 2019-03-18 RX ADMIN — Medication 650 MILLIGRAM(S): at 22:24

## 2019-03-18 RX ADMIN — Medication 100 MILLIGRAM(S): at 10:56

## 2019-03-18 RX ADMIN — Medication 325 MILLIGRAM(S): at 11:00

## 2019-03-18 RX ADMIN — Medication 600 MILLIGRAM(S): at 15:35

## 2019-03-18 RX ADMIN — Medication 600 MILLIGRAM(S): at 04:36

## 2019-03-18 RX ADMIN — SIMETHICONE 80 MILLIGRAM(S): 80 TABLET, CHEWABLE ORAL at 10:56

## 2019-03-18 RX ADMIN — Medication 600 MILLIGRAM(S): at 10:56

## 2019-03-18 RX ADMIN — SIMETHICONE 80 MILLIGRAM(S): 80 TABLET, CHEWABLE ORAL at 15:33

## 2019-03-18 RX ADMIN — Medication 600 MILLIGRAM(S): at 23:05

## 2019-03-18 RX ADMIN — SIMETHICONE 80 MILLIGRAM(S): 80 TABLET, CHEWABLE ORAL at 04:36

## 2019-03-18 RX ADMIN — Medication 600 MILLIGRAM(S): at 05:00

## 2019-03-18 RX ADMIN — Medication 650 MILLIGRAM(S): at 21:23

## 2019-03-18 NOTE — PROVIDER CONTACT NOTE (OTHER) - SITUATION
Pt lightheaded- no tachycardia- VSS.
Pt PO2 with no IV access- Dr. Beltre aware and told day RN not to reinsert.

## 2019-03-18 NOTE — PROGRESS NOTE ADULT - ASSESSMENT
A/P   38y  s/p c/s, POD #2, stable  -  PEC with severe features - now s/p IV magnesium, only with mild headache, will give tylenol; BPs in normal range; Cr trending now, will repeat  -  PPH: EBL 1500cc in OR, s/p hemabate and cytotec - Hb stable at 8.7 and pt asymptomatic  -  Pain: PO motrin, percocet  -  GI: regular diet  -  : voids  -  DVT prophylaxis: ambulation, SCDs, SQH  -  Dispo: POD 3 or 4 A/P   38y  s/p c/s, POD #2, stable  -  PEC with severe features - now s/p IV magnesium, only with mild headache, will give tylenol; BPs in normal range; Cr trending now, will repeat  -  PPH: EBL 1500cc in OR, s/p hemabate and cytotec - Hb stable at 8.7 and pt asymptomatic  -  Pain: PO motrin, percocet  -  GI: regular diet  -  : voids  -  DVT prophylaxis: ambulation, SCDs  -  Dispo: POD 3 or 4

## 2019-03-18 NOTE — PROGRESS NOTE ADULT - SUBJECTIVE AND OBJECTIVE BOX
Pt seen at bedside, reports feeling well overall, has mild HA and just took tylenol for relief. She is ambulating to bathroom and denies dizziness, SOB, CP, palpitations, n/v. She denies heavy vaginal bleeding. Physical exam unremarkable. Will continue to monitor, plan for AM CBC.

## 2019-03-18 NOTE — PROGRESS NOTE ADULT - SUBJECTIVE AND OBJECTIVE BOX
Patient seen. She feels overall well, does feel weak, but hemodynamically stable. Vitals are stable. Abdomen is soft, prevena dressing looks good. Discussed her low blood count. I do not want to rush with transfusion, since I anticipate that her blood count will improve from this point. Advised to be careful when ambulating, out of bed. Expect some dizziness. Will give iron three times a day. If vitals are stable, will not check CBC tonight.

## 2019-03-19 ENCOUNTER — TRANSCRIPTION ENCOUNTER (OUTPATIENT)
Age: 39
End: 2019-03-19

## 2019-03-19 LAB
HCT VFR BLD CALC: 20.8 % — CRITICAL LOW (ref 34.5–45)
HGB BLD-MCNC: 6.7 G/DL — CRITICAL LOW (ref 11.5–15.5)
MCHC RBC-ENTMCNC: 31.3 PG — SIGNIFICANT CHANGE UP (ref 27–34)
MCHC RBC-ENTMCNC: 32.2 GM/DL — SIGNIFICANT CHANGE UP (ref 32–36)
MCV RBC AUTO: 97.2 FL — SIGNIFICANT CHANGE UP (ref 80–100)
NRBC # BLD: 0 /100 WBCS — SIGNIFICANT CHANGE UP (ref 0–0)
PLATELET # BLD AUTO: 225 K/UL — SIGNIFICANT CHANGE UP (ref 150–400)
RBC # BLD: 2.14 M/UL — LOW (ref 3.8–5.2)
RBC # FLD: 13.3 % — SIGNIFICANT CHANGE UP (ref 10.3–14.5)
WBC # BLD: 11.61 K/UL — HIGH (ref 3.8–10.5)
WBC # FLD AUTO: 11.61 K/UL — HIGH (ref 3.8–10.5)

## 2019-03-19 RX ADMIN — Medication 100 MILLIGRAM(S): at 10:06

## 2019-03-19 RX ADMIN — Medication 600 MILLIGRAM(S): at 00:00

## 2019-03-19 RX ADMIN — Medication 600 MILLIGRAM(S): at 16:35

## 2019-03-19 RX ADMIN — Medication 600 MILLIGRAM(S): at 22:03

## 2019-03-19 RX ADMIN — Medication 650 MILLIGRAM(S): at 06:11

## 2019-03-19 RX ADMIN — Medication 100 MILLIGRAM(S): at 22:03

## 2019-03-19 RX ADMIN — Medication 650 MILLIGRAM(S): at 05:45

## 2019-03-19 RX ADMIN — Medication 325 MILLIGRAM(S): at 13:50

## 2019-03-19 RX ADMIN — Medication 600 MILLIGRAM(S): at 23:00

## 2019-03-19 RX ADMIN — Medication 600 MILLIGRAM(S): at 10:46

## 2019-03-19 RX ADMIN — Medication 600 MILLIGRAM(S): at 10:06

## 2019-03-19 RX ADMIN — Medication 325 MILLIGRAM(S): at 07:13

## 2019-03-19 RX ADMIN — Medication 600 MILLIGRAM(S): at 16:00

## 2019-03-19 RX ADMIN — Medication 325 MILLIGRAM(S): at 22:03

## 2019-03-19 NOTE — DISCHARGE NOTE OB - PATIENT PORTAL LINK FT
You can access the [facility], offered by [facility], by registering with the following website: [email or web address]

## 2019-03-19 NOTE — DISCHARGE NOTE OB - CARE PROVIDER_API CALL
Megan Liu ()  Obstetrics and Gynecology  [address] [address]  Phone: [phone]  Fax: [phone]  Follow Up Time:
Discharge plan emailed to RENNY

## 2019-03-19 NOTE — PROGRESS NOTE ADULT - SUBJECTIVE AND OBJECTIVE BOX
Patient evaluated at bedside.   She reports pain is well controlled.  She denies nausea, vomiting or heavy vaginal bleeding.  She has been ambulating without assistance, voiding spontaneously, passing gas, tolerating regular diet and is breastfeeding.  Denies lightheadedness and dizziness with ambulation.    Physical Exam:  Vital Signs Last 24 Hrs  T(C): 36.6 (19 Mar 2019 10:00), Max: 36.8 (18 Mar 2019 14:00)  T(F): 97.8 (19 Mar 2019 10:00), Max: 98.3 (18 Mar 2019 14:00)  HR: 62 (19 Mar 2019 10:00) (56 - 81)  BP: 145/84 (19 Mar 2019 10:00) (124/72 - 145/84)  BP(mean): --  RR: 18 (19 Mar 2019 10:00) (17 - 18)  SpO2: 99% (19 Mar 2019 10:00) (96% - 99%)    GA: NAD, A+0 x 3  Abd: + BS, soft, nontender, nondistended, no rebound or guarding, incision clean, dry and intact, pressure dressing removed, steristrips in place, uterus firm at midline, 3 fb below umbilicus  : lochia WNL  Extremities: no swelling or calf tenderness                            6.7    11.61 )-----------( 225      ( 19 Mar 2019 09:28 )             20.8     03-18    138  |  103  |  9   ----------------------------<  91  4.3   |  28  |  0.89    Ca    7.8<L>      18 Mar 2019 06:52    TPro  4.9<L>  /  Alb  2.2<L>  /  TBili  0.2  /  DBili  x   /  AST  18  /  ALT  7<L>  /  AlkPhos  98  03-18

## 2019-03-19 NOTE — DISCHARGE NOTE OB - CARE PLAN
Principal Discharge DX:	 delivery delivered  Goal:	Routine recovery, resumption of activities  Assessment and plan of treatment:	Routine recovery after  section

## 2019-03-19 NOTE — PROGRESS NOTE ADULT - ASSESSMENT
A/P   38y  s/p c/s, POD #3, stable  -  PEC with severe features - s/p IV magnesium, mild range blood pressures	  -  Anemia - 2/2 blood loss, currently asymptomatic with normal vital signs  -  Pain: PO motrin, percocet  -  GI: reg diet  -  : voiding  -  DVT prophylaxis: ambulation, SCDs, SQH  -  Dispo: POD 3 or 4

## 2019-03-20 VITALS
OXYGEN SATURATION: 99 % | TEMPERATURE: 98 F | DIASTOLIC BLOOD PRESSURE: 81 MMHG | RESPIRATION RATE: 18 BRPM | SYSTOLIC BLOOD PRESSURE: 137 MMHG | HEART RATE: 60 BPM

## 2019-03-20 PROCEDURE — 84550 ASSAY OF BLOOD/URIC ACID: CPT

## 2019-03-20 PROCEDURE — 88307 TISSUE EXAM BY PATHOLOGIST: CPT

## 2019-03-20 PROCEDURE — 84156 ASSAY OF PROTEIN URINE: CPT

## 2019-03-20 PROCEDURE — 36415 COLL VENOUS BLD VENIPUNCTURE: CPT

## 2019-03-20 PROCEDURE — 86850 RBC ANTIBODY SCREEN: CPT

## 2019-03-20 PROCEDURE — 85027 COMPLETE CBC AUTOMATED: CPT

## 2019-03-20 PROCEDURE — 83615 LACTATE (LD) (LDH) ENZYME: CPT

## 2019-03-20 PROCEDURE — 86900 BLOOD TYPING SEROLOGIC ABO: CPT

## 2019-03-20 PROCEDURE — 82962 GLUCOSE BLOOD TEST: CPT

## 2019-03-20 PROCEDURE — 85025 COMPLETE CBC W/AUTO DIFF WBC: CPT

## 2019-03-20 PROCEDURE — 86780 TREPONEMA PALLIDUM: CPT

## 2019-03-20 PROCEDURE — 83735 ASSAY OF MAGNESIUM: CPT

## 2019-03-20 PROCEDURE — 82570 ASSAY OF URINE CREATININE: CPT

## 2019-03-20 PROCEDURE — 80053 COMPREHEN METABOLIC PANEL: CPT

## 2019-03-20 PROCEDURE — 86901 BLOOD TYPING SEROLOGIC RH(D): CPT

## 2019-03-20 PROCEDURE — 99214 OFFICE O/P EST MOD 30 MIN: CPT

## 2019-03-20 RX ADMIN — Medication 325 MILLIGRAM(S): at 14:09

## 2019-03-20 RX ADMIN — Medication 325 MILLIGRAM(S): at 06:15

## 2019-03-20 RX ADMIN — Medication 1 APPLICATION(S): at 10:24

## 2019-03-20 RX ADMIN — Medication 600 MILLIGRAM(S): at 16:09

## 2019-03-20 RX ADMIN — Medication 600 MILLIGRAM(S): at 10:24

## 2019-03-20 RX ADMIN — Medication 600 MILLIGRAM(S): at 17:00

## 2019-03-20 RX ADMIN — Medication 600 MILLIGRAM(S): at 11:10

## 2019-03-20 RX ADMIN — Medication 600 MILLIGRAM(S): at 04:08

## 2019-03-20 RX ADMIN — Medication 600 MILLIGRAM(S): at 05:05

## 2019-03-20 RX ADMIN — Medication 100 MILLIGRAM(S): at 10:24

## 2019-03-20 NOTE — PROGRESS NOTE ADULT - ASSESSMENT
A/P   38y  s/p c/s, POD #4, stable  -  Pain: PO motrin, percocet  -  GI: reg diet  -  : voids  -  DVT prophylaxis: ambulation, SCDs, SQH  -  Dispo: POD 3 or 4

## 2019-03-20 NOTE — PROGRESS NOTE ADULT - SUBJECTIVE AND OBJECTIVE BOX
Patient evaluated at bedside.   She reports pain is well controlled.  She denies nausea, vomiting or heavy vaginal bleeding.  She has been ambulating without assistance, voiding spontaneously, passing gas, tolerating regular diet and is breastfeeding.  No lightheadedness or dizziness.     Physical Exam:  Vital Signs Last 24 Hrs  T(C): 36.5 (20 Mar 2019 10:00), Max: 37.1 (19 Mar 2019 18:00)  T(F): 97.7 (20 Mar 2019 10:00), Max: 98.7 (19 Mar 2019 18:00)  HR: 57 (20 Mar 2019 10:00) (57 - 67)  BP: 136/79 (20 Mar 2019 10:00) (118/72 - 148/88)  BP(mean): --  RR: 18 (20 Mar 2019 10:00) (18 - 18)  SpO2: 99% (20 Mar 2019 10:00) (97% - 99%)    GA: NAD, A+0 x 3  Abd: + BS, soft, nontender, nondistended, no rebound or guarding, incision clean, dry and intact, pressure dressing removed, steristrips in place, uterus firm at midline, 3 fb below umbilicus  : lochia WNL  Extremities: no swelling or calf tenderness                            6.7    11.61 )-----------( 225      ( 19 Mar 2019 09:28 )             20.8

## 2019-03-22 LAB — SURGICAL PATHOLOGY STUDY: SIGNIFICANT CHANGE UP

## 2019-03-26 DIAGNOSIS — O32.4XX0 MATERNAL CARE FOR HIGH HEAD AT TERM, NOT APPLICABLE OR UNSPECIFIED: ICD-10-CM

## 2019-03-26 DIAGNOSIS — Z3A.40 40 WEEKS GESTATION OF PREGNANCY: ICD-10-CM

## 2019-03-26 DIAGNOSIS — O40.3XX0 POLYHYDRAMNIOS, THIRD TRIMESTER, NOT APPLICABLE OR UNSPECIFIED: ICD-10-CM

## 2019-03-26 DIAGNOSIS — D50.0 IRON DEFICIENCY ANEMIA SECONDARY TO BLOOD LOSS (CHRONIC): ICD-10-CM

## 2019-03-26 DIAGNOSIS — Z34.83 ENCOUNTER FOR SUPERVISION OF OTHER NORMAL PREGNANCY, THIRD TRIMESTER: ICD-10-CM

## 2023-09-26 NOTE — LACTATION INITIAL EVALUATION - NS LACT CON HTN TYPE
pregnancy-induced hypertension Griseofulvin Counseling:  I discussed with the patient the risks of griseofulvin including but not limited to photosensitivity, cytopenia, liver damage, nausea/vomiting and severe allergy.  The patient understands that this medication is best absorbed when taken with a fatty meal (e.g., ice cream or french fries).

## 2023-12-18 NOTE — PROGRESS NOTE ADULT - SUBJECTIVE AND OBJECTIVE BOX
Patient evaluated at bedside.   She reports pain is well controlled.  She denies nausea, vomiting or heavy vaginal bleeding.  She has been ambulating without assistance, voiding spontaneously, passing gas, tolerating regular diet and is breastfeeding.  Currently reports a mild headache since discontinuation of IV magnesium, but denies vision changes or epigastric pain.     Physical Exam:  Vital Signs Last 24 Hrs  T(C): 37 (18 Mar 2019 02:03), Max: 37 (18 Mar 2019 02:03)  T(F): 98.6 (18 Mar 2019 02:03), Max: 98.6 (18 Mar 2019 02:03)  HR: 77 (18 Mar 2019 02:03) (70 - 80)  BP: 102/59 (18 Mar 2019 02:03) (102/59 - 132/73)  BP(mean): --  RR: 18 (18 Mar 2019 02:03) (16 - 18)  SpO2: 97% (18 Mar 2019 02:03) (97% - 97%)    GA: NAD, A+0 x 3  Abd: + BS, soft, nontender, nondistended, no rebound or guarding, Prevena vacuum dressing in place, uterus firm at midline, 3 fb below umbilicus  : lochia WNL  Extremities: no swelling or calf tenderness                            8.7    17.60 )-----------( 174      ( 16 Mar 2019 10:58 )             26.0     03-16    135  |  102  |  10  ----------------------------<  187<H>  4.2   |  21<L>  |  0.93    Ca    8.0<L>      16 Mar 2019 10:58  Mg     6.4     03-17    TPro  5.0<L>  /  Alb  2.6<L>  /  TBili  0.3  /  DBili  x   /  AST  30  /  ALT  10  /  AlkPhos  91  03-16 no